# Patient Record
Sex: FEMALE | Race: AMERICAN INDIAN OR ALASKA NATIVE
[De-identification: names, ages, dates, MRNs, and addresses within clinical notes are randomized per-mention and may not be internally consistent; named-entity substitution may affect disease eponyms.]

---

## 2018-03-11 ENCOUNTER — HOSPITAL ENCOUNTER (EMERGENCY)
Dept: HOSPITAL 31 - C.ER | Age: 68
Discharge: HOME | End: 2018-03-11
Payer: MEDICARE

## 2018-03-11 VITALS — TEMPERATURE: 98 F | DIASTOLIC BLOOD PRESSURE: 74 MMHG | SYSTOLIC BLOOD PRESSURE: 164 MMHG | HEART RATE: 71 BPM

## 2018-03-11 VITALS — RESPIRATION RATE: 20 BRPM

## 2018-03-11 DIAGNOSIS — M54.30: Primary | ICD-10-CM

## 2018-03-11 NOTE — C.PDOC
History Of Present Illness


67 year old female presents to the ER complaining of left hip pain for the past 

1 month. Today pain began radiating to the left buttock/thigh/upper leg region. 

No imaging has been done. Fall/injury was sustained 1 month ago. Patient denies 

any new fall or injury. No weakness or numbness.





Time Seen by Provider: 03/11/18 21:39


Chief Complaint (Nursing): Hip Pain


History Per: Patient


History/Exam Limitations: no limitations


Onset/Duration Of Symptoms: Days (x 1 month)


Current Symptoms Are (Timing): Still Present





Past Medical History


Reviewed: Historical Data, Nursing Documentation, Vital Signs


Vital Signs: 


 Last Vital Signs











Temp  98 F   03/11/18 22:50


 


Pulse  71   03/11/18 22:50


 


Resp  20   03/11/18 22:50


 


BP  164/74 H  03/11/18 22:50


 


Pulse Ox  96   03/12/18 05:57














- Medical History


PMH: Diabetes (type II), HTN, Hypercholesterolemia


Family History: States: No Known Family Hx





- Social History


Hx Alcohol Use: No


Hx Substance Use: No





- Immunization History


Hx Tetanus Toxoid Vaccination: No


Hx Influenza Vaccination: Yes


Hx Pneumococcal Vaccination: Yes





Review Of Systems


Except As Marked, All Systems Reviewed And Found Negative.


Musculoskeletal: Positive for: Other (left hip pain radiating to left buttocks/

upper leg)


Neurological: Negative for: Weakness, Numbness





Physical Exam





- Physical Exam


Appears: Non-toxic, No Acute Distress


Skin: Normal Color, Warm, Dry


Head: Atraumatic, Normacephalic


Eye(s): bilateral: Normal Inspection, PERRL, EOMI


Nose: Normal


Oral Mucosa: Moist


Chest: Symmetrical


Cardiovascular: Rhythm Regular, No Murmur


Respiratory: Normal Breath Sounds, No Accessory Muscle Use


Gastrointestinal/Abdominal: Soft, No Tenderness, No Distention


Back: Normal Inspection, No CVA Tenderness, No Vertebral Tenderness


Extremity: Normal ROM, Tenderness (to the left hip mild and left buttocks), No 

Deformity, No Swelling, Other (Strength is 5/5 throughout)


Neurological/Psych: Oriented x3, Normal Speech, Normal Motor, Normal Sensation, 

Other (Patient is ambulatory [facility] ED)


Gait: Steady





ED Course And Treatment


O2 Sat by Pulse Oximetry: 96 (RA)


Pulse Ox Interpretation: Normal





- Other Rad


  ** left hip


X-Ray: Interpreted by Me (fx or dioslocation), Viewed By Me


Progress Note: Patient given 550 mg naproxen PO. X-ray of left hip ordered.  Pt 

informed of negative X-ray findings. Stable for d/c home.


Reassessment Condition: Improved (feels better after meds and is fully 

ambulaory in ED. Pt advised follow up with pMD for further evvaluation. Pt d/c 

home with spouse)





Disposition


Counseled Patient/Family Regarding: Studies Performed, Diagnosis, Need For 

Followup, Rx Given





- Disposition


Referrals: 


Sharon Rudd MD [Staff Provider] - 


Disposition: HOME/ ROUTINE


Disposition Time: 22:43


Condition: STABLE


Additional Instructions: 


PLEASE FOLLOW UP WITH PMD 





CONTINUE IBUPROFEN PO





RETURN TO ER IF WORSE 


Prescriptions: 


Ibuprofen [Motrin] 600 mg PO Q6H #20 tab


Instructions:  Sciatica (DC)


Forms:  [facility] (English)





- POA


Present On Arrival: None





- Clinical Impression


Clinical Impression: 


 Sciatica








- PA / NP / Resident Statement


MD/DO has reviewed & agrees with the documentation as recorded.





- Scribe Statement


The provider has reviewed the documentation as recorded by the Scribe (Lorna De Paz)





All medical record entries made by the Scribe were at my direction and 

personally dictated by me. I have reviewed the chart and agree that the record 

accurately reflects my personal performance of the history, physical exam, 

medical decision making, and the department course for this patient. I have 

also personally directed, reviewed, and agree with the discharge instructions 

and disposition.

## 2018-03-12 VITALS — OXYGEN SATURATION: 96 %

## 2018-03-12 NOTE — RAD
PROCEDURE:  Left Hip X-ray Radiographs.



HISTORY:

pain to left hip  



COMPARISON:

None.



FINDINGS:



BONES:

Normal. No fracture. 



JOINTS:

Unremarkable left hip.  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 



SOFT TISSUES:

Normal. 



OTHER FINDINGS:

None.



IMPRESSION:

Unremarkable left hip.  Mild osteoarthritis of the right hip.